# Patient Record
Sex: MALE | Race: WHITE | NOT HISPANIC OR LATINO | Employment: FULL TIME | URBAN - METROPOLITAN AREA
[De-identification: names, ages, dates, MRNs, and addresses within clinical notes are randomized per-mention and may not be internally consistent; named-entity substitution may affect disease eponyms.]

---

## 2017-06-08 ENCOUNTER — HOSPITAL ENCOUNTER (OUTPATIENT)
Facility: MEDICAL CENTER | Age: 27
End: 2017-06-08
Payer: COMMERCIAL

## 2017-06-08 LAB
ANION GAP SERPL CALC-SCNC: 10 MMOL/L (ref 0–11.9)
BDY FAT % MEASURED: 25.5 %
BP DIAS: 84 MMHG
BP SYS: 130 MMHG
BUN SERPL-MCNC: 16 MG/DL (ref 8–22)
CALCIUM SERPL-MCNC: 10 MG/DL (ref 8.5–10.5)
CHLORIDE SERPL-SCNC: 105 MMOL/L (ref 96–112)
CHOLEST SERPL-MCNC: 210 MG/DL (ref 100–199)
CO2 SERPL-SCNC: 25 MMOL/L (ref 20–33)
CREAT SERPL-MCNC: 1.04 MG/DL (ref 0.5–1.4)
DIABETES HTDIA: NO
EVENT NAME HTEVT: NORMAL
GFR SERPL CREATININE-BSD FRML MDRD: >60 ML/MIN/1.73 M 2
GLUCOSE SERPL-MCNC: 91 MG/DL (ref 65–99)
HDLC SERPL-MCNC: 46 MG/DL
HYPERTENSION HTHYP: NO
LDLC SERPL CALC-MCNC: 152 MG/DL
POTASSIUM SERPL-SCNC: 4.3 MMOL/L (ref 3.6–5.5)
SCREENING LOC CITY HTCIT: NORMAL
SCREENING LOC STATE HTSTA: NORMAL
SCREENING LOCATION HTLOC: NORMAL
SODIUM SERPL-SCNC: 140 MMOL/L (ref 135–145)
SUBSCRIBER ID HTSID: NORMAL
TRIGL SERPL-MCNC: 59 MG/DL (ref 0–149)

## 2017-08-30 ENCOUNTER — OFFICE VISIT (OUTPATIENT)
Dept: URGENT CARE | Facility: CLINIC | Age: 27
End: 2017-08-30
Payer: COMMERCIAL

## 2017-08-30 VITALS
OXYGEN SATURATION: 98 % | BODY MASS INDEX: 27.63 KG/M2 | HEIGHT: 72 IN | DIASTOLIC BLOOD PRESSURE: 82 MMHG | HEART RATE: 52 BPM | SYSTOLIC BLOOD PRESSURE: 112 MMHG | TEMPERATURE: 98 F | RESPIRATION RATE: 16 BRPM | WEIGHT: 204 LBS

## 2017-08-30 DIAGNOSIS — B35.3 TINEA PEDIS OF RIGHT FOOT: ICD-10-CM

## 2017-08-30 PROCEDURE — 99203 OFFICE O/P NEW LOW 30 MIN: CPT | Performed by: FAMILY MEDICINE

## 2017-08-30 RX ORDER — TOLNAFTATE 1 G/100G
POWDER TOPICAL
Qty: 1 CAN | Refills: 3 | Status: SHIPPED | OUTPATIENT
Start: 2017-08-30 | End: 2021-07-07

## 2017-08-30 ASSESSMENT — ENCOUNTER SYMPTOMS
FEVER: 0
FOCAL WEAKNESS: 0
CHILLS: 0
SHORTNESS OF BREATH: 0
DIZZINESS: 0
HEMOPTYSIS: 0
ORTHOPNEA: 0

## 2017-08-30 NOTE — PROGRESS NOTES
Subjective:      Adam Cobos is a 27 y.o. male who presents with Eczema (x 1 month/ right foot)    Chief Complaint   Patient presents with   • Eczema     x 1 month/ right foot        - This is a very pleasant 27 y.o. male with complaints of irritated rash toes Rt foot x 4wks.            ALLERGIES:  Review of patient's allergies indicates not on file.     PMH:  No past medical history on file.     MEDS:    Current Outpatient Prescriptions:   •  econazole nitrate 1 % cream, Apply BID x 3-4 wks, Disp: 30 g, Rfl: 1  •  tolnaftate (TINACTIN) 1 % powder, OTC use as directed daily in shoes, Disp: 1 Can, Rfl: 3    ** I have documented what I find to be significant in regards to past medical, social, family and surgical history  in my HPI or under PMH/PSH/FH review section, otherwise it is contributory **             HPI    Review of Systems   Constitutional: Negative for chills and fever.   Respiratory: Negative for hemoptysis and shortness of breath.    Cardiovascular: Negative for chest pain and orthopnea.   Neurological: Negative for dizziness and focal weakness.          Objective:     /82   Pulse (!) 52   Temp 36.7 °C (98 °F)   Resp 16   Ht 1.829 m (6')   Wt 92.5 kg (204 lb)   SpO2 98%   BMI 27.67 kg/m²      Physical Exam   Constitutional: He appears well-developed. No distress.   HENT:   Head: Normocephalic and atraumatic.   Eyes: Conjunctivae are normal.   Neck: Neck supple.   Cardiovascular: Regular rhythm.    No murmur heard.  Pulmonary/Chest: Effort normal. No respiratory distress.   Neurological: He is alert. He exhibits normal muscle tone.   Skin: Skin is warm and dry.   Psychiatric: He has a normal mood and affect. Judgment normal.   Nursing note and vitals reviewed.  Rt foot: erythema scaling maceration webspace toes 2-3            Assessment/Plan:         1. Tinea pedis of right foot  econazole nitrate 1 % cream    tolnaftate (TINACTIN) 1 % powder             Dx & d/c instructions discussed w/  patient and/or family members. Follow up w/ Prvt Dr or here in 3-4 days if not getting better, sooner if needed,  ER if worse and UC/PCP unavailable.        Possible side effects (i.e. Rash, GI upset/constipation, sedation, elevation of BP or sugars) of any medications given discussed.

## 2020-07-21 ENCOUNTER — HOSPITAL ENCOUNTER (OUTPATIENT)
Facility: MEDICAL CENTER | Age: 30
End: 2020-07-21
Attending: PHYSICIAN ASSISTANT
Payer: COMMERCIAL

## 2020-07-21 ENCOUNTER — OFFICE VISIT (OUTPATIENT)
Dept: URGENT CARE | Facility: CLINIC | Age: 30
End: 2020-07-21
Payer: COMMERCIAL

## 2020-07-21 VITALS
WEIGHT: 217 LBS | OXYGEN SATURATION: 99 % | RESPIRATION RATE: 16 BRPM | HEIGHT: 72 IN | DIASTOLIC BLOOD PRESSURE: 72 MMHG | SYSTOLIC BLOOD PRESSURE: 106 MMHG | BODY MASS INDEX: 29.39 KG/M2 | TEMPERATURE: 98.8 F | HEART RATE: 74 BPM

## 2020-07-21 DIAGNOSIS — R22.1 NECK SWELLING: ICD-10-CM

## 2020-07-21 LAB
BASOPHILS # BLD AUTO: 0.8 % (ref 0–1.8)
BASOPHILS # BLD: 0.07 K/UL (ref 0–0.12)
EOSINOPHIL # BLD AUTO: 0.61 K/UL (ref 0–0.51)
EOSINOPHIL NFR BLD: 6.6 % (ref 0–6.9)
ERYTHROCYTE [DISTWIDTH] IN BLOOD BY AUTOMATED COUNT: 41.4 FL (ref 35.9–50)
HCT VFR BLD AUTO: 44.1 % (ref 42–52)
HGB BLD-MCNC: 14.7 G/DL (ref 14–18)
IMM GRANULOCYTES # BLD AUTO: 0.04 K/UL (ref 0–0.11)
IMM GRANULOCYTES NFR BLD AUTO: 0.4 % (ref 0–0.9)
LYMPHOCYTES # BLD AUTO: 2.47 K/UL (ref 1–4.8)
LYMPHOCYTES NFR BLD: 26.6 % (ref 22–41)
MCH RBC QN AUTO: 29.6 PG (ref 27–33)
MCHC RBC AUTO-ENTMCNC: 33.3 G/DL (ref 33.7–35.3)
MCV RBC AUTO: 88.7 FL (ref 81.4–97.8)
MONOCYTES # BLD AUTO: 0.65 K/UL (ref 0–0.85)
MONOCYTES NFR BLD AUTO: 7 % (ref 0–13.4)
NEUTROPHILS # BLD AUTO: 5.44 K/UL (ref 1.82–7.42)
NEUTROPHILS NFR BLD: 58.6 % (ref 44–72)
NRBC # BLD AUTO: 0 K/UL
NRBC BLD-RTO: 0 /100 WBC
PLATELET # BLD AUTO: 278 K/UL (ref 164–446)
PMV BLD AUTO: 10.4 FL (ref 9–12.9)
RBC # BLD AUTO: 4.97 M/UL (ref 4.7–6.1)
WBC # BLD AUTO: 9.3 K/UL (ref 4.8–10.8)

## 2020-07-21 PROCEDURE — 99999 PR NO CHARGE: CPT | Performed by: PHYSICIAN ASSISTANT

## 2020-07-21 PROCEDURE — 99000 SPECIMEN HANDLING OFFICE-LAB: CPT | Performed by: PHYSICIAN ASSISTANT

## 2020-07-21 PROCEDURE — 99214 OFFICE O/P EST MOD 30 MIN: CPT | Performed by: PHYSICIAN ASSISTANT

## 2020-07-21 PROCEDURE — 85025 COMPLETE CBC W/AUTO DIFF WBC: CPT

## 2020-07-21 ASSESSMENT — ENCOUNTER SYMPTOMS
EYE DISCHARGE: 0
VOMITING: 0
DIARRHEA: 0
ABDOMINAL PAIN: 0
NAUSEA: 0
NECK PAIN: 1
SHORTNESS OF BREATH: 0
FEVER: 0
CHILLS: 0
DIZZINESS: 0
EYE REDNESS: 0

## 2020-07-21 NOTE — PROGRESS NOTES
Subjective:      Adam Cobos is a 30 y.o. male who presents with Neck Pain (swollen on the left side for 24 hours, uncomfortable for 2 days prior to that.)            Neck Pain    This is a new problem. The current episode started in the past 7 days (2 days). The problem occurs constantly. The problem has been unchanged. The pain is present in the left side. The quality of the pain is described as aching. The pain is at a severity of 2/10. The pain is mild. Pertinent negatives include no chest pain or fever. He has tried nothing for the symptoms.     Patient presents to urgent care reporting a left sided neck pain and swelling starting 2 days ago. It was noticeably swollen yesterday, but today it has improved significantly and has almost resolved completely. No recent injuries. No fevers, chills, body aches, cough, sore throat, congestion, ear pain, throat tightness, rashes, or SOB. No recent neck injuries. He hasn't taken any OTC medications for the pain. He has no known medical problems and doesn't take any regular medications.     Review of Systems   Constitutional: Negative for chills and fever.   HENT: Negative for congestion.    Eyes: Negative for discharge and redness.   Respiratory: Negative for shortness of breath.    Cardiovascular: Negative for chest pain.   Gastrointestinal: Negative for abdominal pain, diarrhea, nausea and vomiting.   Genitourinary: Negative.    Musculoskeletal: Positive for neck pain.   Skin: Negative for rash.   Neurological: Negative for dizziness.        Objective:     /72 (BP Location: Left arm, Patient Position: Sitting, BP Cuff Size: Adult)   Pulse 74   Temp 37.1 °C (98.8 °F) (Temporal)   Resp 16   Ht 1.829 m (6')   Wt 98.4 kg (217 lb)   SpO2 99%   BMI 29.43 kg/m²        Physical Exam  Vitals signs and nursing note reviewed.   Constitutional:       Appearance: Normal appearance. He is well-developed.   HENT:      Head: Normocephalic and atraumatic.      Right Ear:  Hearing, tympanic membrane, ear canal and external ear normal.      Left Ear: Hearing, tympanic membrane, ear canal and external ear normal.      Nose: Nose normal. No congestion or rhinorrhea.      Mouth/Throat:      Mouth: Mucous membranes are moist.      Pharynx: Uvula midline. No pharyngeal swelling, oropharyngeal exudate, posterior oropharyngeal erythema or uvula swelling.      Tonsils: No tonsillar exudate or tonsillar abscesses.   Eyes:      General:         Right eye: No discharge.         Left eye: No discharge.      Conjunctiva/sclera: Conjunctivae normal.      Pupils: Pupils are equal, round, and reactive to light.   Neck:      Musculoskeletal: Normal range of motion.        Comments: Very minimal edema localized to left lateral neck just inferior to jaw. No overlying erythema, edema, warmth to touch, or TTP.   Cardiovascular:      Rate and Rhythm: Normal rate and regular rhythm.   Pulmonary:      Effort: Pulmonary effort is normal.   Musculoskeletal: Normal range of motion.   Skin:     General: Skin is warm and dry.   Neurological:      Mental Status: He is alert and oriented to person, place, and time.   Psychiatric:         Behavior: Behavior normal.          PMH:  has no past medical history on file.  MEDS:   Current Outpatient Medications:   •  econazole nitrate 1 % cream, Apply BID x 3-4 wks (Patient not taking: Reported on 7/21/2020), Disp: 30 g, Rfl: 1  •  tolnaftate (TINACTIN) 1 % powder, OTC use as directed daily in shoes (Patient not taking: Reported on 7/21/2020), Disp: 1 Can, Rfl: 3  ALLERGIES: No Known Allergies  SURGHX: History reviewed. No pertinent surgical history.  SOCHX:  has an unknown smoking status. He has never used smokeless tobacco.  FH: family history is not on file.       Assessment/Plan:       1. Neck swelling    - CBC WITH DIFFERENTIAL; Future    Discussed CBC results with patient, no evidence of infectious or inflammatory process at today's visit. Pain and swelling is  improving. No evidence of peritonsillar abscess on exam. Encouraged to monitor closely and RTC or go to the ED for further evaluation and management if symptoms persist/worsen. The patient demonstrated a good understanding and agreed with the treatment plan.

## 2020-10-28 ENCOUNTER — HOSPITAL ENCOUNTER (OUTPATIENT)
Facility: MEDICAL CENTER | Age: 30
End: 2020-10-28
Attending: PHYSICIAN ASSISTANT
Payer: COMMERCIAL

## 2020-10-28 ENCOUNTER — OFFICE VISIT (OUTPATIENT)
Dept: URGENT CARE | Facility: CLINIC | Age: 30
End: 2020-10-28
Payer: COMMERCIAL

## 2020-10-28 VITALS
OXYGEN SATURATION: 96 % | HEART RATE: 80 BPM | DIASTOLIC BLOOD PRESSURE: 72 MMHG | RESPIRATION RATE: 14 BRPM | BODY MASS INDEX: 29.53 KG/M2 | SYSTOLIC BLOOD PRESSURE: 124 MMHG | WEIGHT: 218 LBS | TEMPERATURE: 97 F | HEIGHT: 72 IN

## 2020-10-28 DIAGNOSIS — Z20.822 EXPOSURE TO COVID-19 VIRUS: ICD-10-CM

## 2020-10-28 PROCEDURE — 99213 OFFICE O/P EST LOW 20 MIN: CPT | Mod: CS | Performed by: PHYSICIAN ASSISTANT

## 2020-10-28 PROCEDURE — U0003 INFECTIOUS AGENT DETECTION BY NUCLEIC ACID (DNA OR RNA); SEVERE ACUTE RESPIRATORY SYNDROME CORONAVIRUS 2 (SARS-COV-2) (CORONAVIRUS DISEASE [COVID-19]), AMPLIFIED PROBE TECHNIQUE, MAKING USE OF HIGH THROUGHPUT TECHNOLOGIES AS DESCRIBED BY CMS-2020-01-R: HCPCS

## 2020-10-28 SDOH — HEALTH STABILITY: MENTAL HEALTH: HOW OFTEN DO YOU HAVE A DRINK CONTAINING ALCOHOL?: MONTHLY OR LESS

## 2020-10-28 ASSESSMENT — ENCOUNTER SYMPTOMS
SPUTUM PRODUCTION: 0
CHILLS: 0
FEVER: 0
WHEEZING: 0
PALPITATIONS: 0
SORE THROAT: 0
COUGH: 1
HEMOPTYSIS: 0
SHORTNESS OF BREATH: 0
HEADACHES: 1

## 2020-10-28 NOTE — PROGRESS NOTES
Subjective:   Adam Cobos is a 30 y.o. male who presents for Coronavirus Screening (cough, sorethroat, wakes up with headaches. )      Cough  This is a new problem. The current episode started in the past 7 days. The problem has been unchanged. Associated symptoms include headaches. Pertinent negatives include no chest pain, chills, ear pain, fever, hemoptysis, sore throat, shortness of breath or wheezing. Nothing aggravates the symptoms. He has tried OTC cough suppressant for the symptoms. The treatment provided no relief.       Review of Systems   Constitutional: Positive for malaise/fatigue. Negative for chills and fever.   HENT: Negative for congestion, ear pain and sore throat.    Respiratory: Positive for cough. Negative for hemoptysis, sputum production, shortness of breath and wheezing.    Cardiovascular: Negative for chest pain and palpitations.   Neurological: Positive for headaches.   All other systems reviewed and are negative.      Medications:    • econazole nitrate  • tolnaftate    Allergies: Patient has no known allergies.    Problem List: Adam Cobos does not have a problem list on file.    Surgical History:  No past surgical history on file.    Past Social Hx: Adam Cobos  reports that he has never smoked. He has never used smokeless tobacco. He reports current alcohol use. He reports that he does not use drugs.     Past Family Hx:  Adam Cobos family history is not on file.     Problem list, medications, and allergies reviewed by myself today in Epic.     Objective:     Blood Pressure 124/72 (BP Location: Left arm, Patient Position: Sitting, BP Cuff Size: Adult)   Pulse 80   Temperature 36.1 °C (97 °F) (Temporal)   Respiration 14   Height 1.829 m (6')   Weight 98.9 kg (218 lb)   Oxygen Saturation 96%   Body Mass Index 29.57 kg/m²     Physical Exam  Vitals signs reviewed.   Constitutional:       General: He is not in acute distress.     Appearance: He is well-developed. He is not  ill-appearing or toxic-appearing.      Interventions: He is not intubated.  HENT:      Head: Normocephalic and atraumatic.      Right Ear: Hearing, tympanic membrane, ear canal and external ear normal.      Left Ear: Hearing, tympanic membrane, ear canal and external ear normal.      Nose: Nose normal.      Mouth/Throat:      Pharynx: Uvula midline.   Eyes:      General: Lids are normal.      Conjunctiva/sclera: Conjunctivae normal.   Neck:      Musculoskeletal: Full passive range of motion without pain, normal range of motion and neck supple.   Cardiovascular:      Rate and Rhythm: Regular rhythm.      Heart sounds: Normal heart sounds, S1 normal and S2 normal. No murmur. No friction rub. No gallop.    Pulmonary:      Effort: Pulmonary effort is normal. No tachypnea, bradypnea, accessory muscle usage or respiratory distress. He is not intubated.      Breath sounds: Normal breath sounds. No decreased breath sounds, wheezing, rhonchi or rales.   Chest:      Chest wall: No tenderness.   Musculoskeletal: Normal range of motion.   Skin:     General: Skin is warm and dry.   Neurological:      Mental Status: He is alert and oriented to person, place, and time.   Psychiatric:         Speech: Speech normal.         Behavior: Behavior normal.         Thought Content: Thought content normal.         Judgment: Judgment normal.         Assessment/Plan:     Medical Decision Making/Comments   Pt is a 30 yr old male who presents for evaluation of possible Covid-19 infection.  Pt states possible exposure. Complains of headache and bodyaches.  Vitals and exam unremarkable.       Diagnosis and associated orders     1. Exposure to COVID-19 virus  COVID/SARS COV-2 PCR   - isolate for 24-72 hrs while pcr test is pending  - treat symptoms with OTC medications             Differential diagnosis, natural history, supportive care, and indications for immediate follow-up discussed.    Advised the patient to follow-up with the primary care  physician for recheck, reevaluation, and consideration of further management.    Please note that this dictation was created using voice recognition software. I have made a reasonable attempt to correct obvious errors, but I expect that there are errors of grammar and possibly content that I did not discover before finalizing the note.

## 2020-10-29 DIAGNOSIS — Z20.822 EXPOSURE TO COVID-19 VIRUS: ICD-10-CM

## 2020-10-29 LAB
COVID ORDER STATUS COVID19: NORMAL
SARS-COV-2 RNA RESP QL NAA+PROBE: NOTDETECTED
SPECIMEN SOURCE: NORMAL

## 2021-07-07 ENCOUNTER — OFFICE VISIT (OUTPATIENT)
Dept: URGENT CARE | Facility: CLINIC | Age: 31
End: 2021-07-07
Payer: COMMERCIAL

## 2021-07-07 VITALS
OXYGEN SATURATION: 99 % | HEIGHT: 72 IN | HEART RATE: 64 BPM | TEMPERATURE: 98.2 F | WEIGHT: 215 LBS | SYSTOLIC BLOOD PRESSURE: 118 MMHG | DIASTOLIC BLOOD PRESSURE: 68 MMHG | BODY MASS INDEX: 29.12 KG/M2 | RESPIRATION RATE: 12 BRPM

## 2021-07-07 DIAGNOSIS — S39.012A LUMBAR STRAIN, INITIAL ENCOUNTER: ICD-10-CM

## 2021-07-07 PROCEDURE — 99214 OFFICE O/P EST MOD 30 MIN: CPT | Performed by: PHYSICIAN ASSISTANT

## 2021-07-07 RX ORDER — CYCLOBENZAPRINE HCL 5 MG
5-10 TABLET ORAL
Qty: 15 TABLET | Refills: 0 | Status: SHIPPED | OUTPATIENT
Start: 2021-07-07

## 2021-07-07 RX ORDER — IBUPROFEN 200 MG
200 TABLET ORAL EVERY 6 HOURS PRN
COMMUNITY

## 2021-07-07 RX ORDER — KETOROLAC TROMETHAMINE 30 MG/ML
30 INJECTION, SOLUTION INTRAMUSCULAR; INTRAVENOUS ONCE
Status: COMPLETED | OUTPATIENT
Start: 2021-07-07 | End: 2021-07-07

## 2021-07-07 RX ADMIN — KETOROLAC TROMETHAMINE 30 MG: 30 INJECTION, SOLUTION INTRAMUSCULAR; INTRAVENOUS at 09:14

## 2021-07-07 ASSESSMENT — ENCOUNTER SYMPTOMS
HEADACHES: 0
CONSTIPATION: 0
VOMITING: 0
MYALGIAS: 0
NAUSEA: 0
SHORTNESS OF BREATH: 0
EYE PAIN: 0
COUGH: 0
DIARRHEA: 0
FEVER: 0
SORE THROAT: 0
CHILLS: 0
ABDOMINAL PAIN: 0
BACK PAIN: 1

## 2021-07-07 NOTE — PROGRESS NOTES
Subjective:   Tawanda Cobos is a 31 y.o. male who presents for Low Back Pain (x3 days, left side )      HPI:  This is a pleasant 31-year-old male who presents complaining of left-sided lower back pain that began around 3 days ago.  Patient reports that he has been increasing his weightlifting regimen for the last several weeks and was at the sink at work washing out additionally he felt his left low back give out and clench.  He went home that day and rested and took high-dose anti-inflammatories, the following morning he woke up and his back was quite stiff, similarly today the pain seems to be slightly better but the back is quite stiff and he presents for evaluation.  He has no history of back trauma or surgery.  He denies any numbness, weakness, tingling, saddle anesthesia, bladder or bowel continence issues.    Review of Systems   Constitutional: Negative for chills and fever.   HENT: Negative for congestion, ear pain and sore throat.    Eyes: Negative for pain.   Respiratory: Negative for cough and shortness of breath.    Cardiovascular: Negative for chest pain.   Gastrointestinal: Negative for abdominal pain, constipation, diarrhea, nausea and vomiting.   Genitourinary: Negative for dysuria.   Musculoskeletal: Positive for back pain. Negative for myalgias.   Skin: Negative for rash.   Neurological: Negative for headaches.       Medications:    • cyclobenzaprine  • diclofenac sodium Gel  • ibuprofen Tabs    Allergies: Patient has no known allergies.    Problem List: Tawanda Cobos does not have a problem list on file.    Surgical History:  No past surgical history on file.    Past Social Hx: Tawanda Cobos  reports that he has never smoked. He has never used smokeless tobacco. He reports current alcohol use. He reports that he does not use drugs.     Past Family Hx:  Tawanda Cobos family history is not on file.     Problem list, medications, and  allergies reviewed by myself today in Epic.     Objective:     /68   Pulse 64   Temp 36.8 °C (98.2 °F) (Temporal)   Resp 12   Ht 1.829 m (6')   Wt 97.5 kg (215 lb)   SpO2 99%   BMI 29.16 kg/m²     Physical Exam  Vitals reviewed.   Constitutional:       Appearance: Normal appearance.   HENT:      Head: Normocephalic and atraumatic.      Right Ear: External ear normal.      Left Ear: External ear normal.      Nose: Nose normal.      Mouth/Throat:      Mouth: Mucous membranes are moist.   Eyes:      Conjunctiva/sclera: Conjunctivae normal.   Cardiovascular:      Rate and Rhythm: Normal rate.   Pulmonary:      Effort: Pulmonary effort is normal.   Musculoskeletal:      Comments: Mild left SI TTP in left lower lumbar spasm.  No appreciable midline step-off, crepitus or deformity.  Flexion extension as well as rotation of the spine intact with normal range of motion.  Ambulatory with a steady station gait.  2+ symmetrical patellar reflexes bilaterally with 5 out of 5 strength of the lower extremities.  Negative passive and active straight leg raise bilaterally   Skin:     General: Skin is warm and dry.      Capillary Refill: Capillary refill takes less than 2 seconds.   Neurological:      Mental Status: He is alert and oriented to person, place, and time.         Assessment/Plan:     Diagnosis and associated orders:     1. Lumbar strain, initial encounter  ketorolac (TORADOL) injection 30 mg    cyclobenzaprine (FLEXERIL) 5 mg tablet    diclofenac sodium 1 % Gel      Comments/MDM:     • Guidelines would suggest no indication for radiographs at this time which I agree with.  No significant mechanism or trauma no red flags for cord compression syndrome or disc herniation.  Recommended behavior modification, avoidance of weight lifting until significantly improved, gentle stretching, Toradol in clinic followed by moderate dosed anti-inflammatories for the next 96 hours and then as needed.  Trial of Flexeril in  the evenings, patient warned about the sedating nature of this medication.  Trial of diclofenac topical gel.  If patient's not improving in 2 to 4 weeks return for reevaluation and management.         Differential diagnosis, natural history, supportive care, and indications for immediate follow-up discussed.    Advised the patient to follow-up with the primary care physician for recheck, reevaluation, and consideration of further management.    Please note that this dictation was created using voice recognition software. I have made a reasonable attempt to correct obvious errors, but I expect that there are errors of grammar and possibly content that I did not discover before finalizing the note.    This note was electronically signed by Pito Angel PA-C